# Patient Record
Sex: FEMALE | Race: WHITE | NOT HISPANIC OR LATINO | Employment: OTHER | URBAN - METROPOLITAN AREA
[De-identification: names, ages, dates, MRNs, and addresses within clinical notes are randomized per-mention and may not be internally consistent; named-entity substitution may affect disease eponyms.]

---

## 2017-12-27 NOTE — PATIENT DISCUSSION
Discussed near vision options: glasses over CL, monovision, and multifocal contact lenses with patient.

## 2018-01-17 ENCOUNTER — IMPORTED ENCOUNTER (OUTPATIENT)
Dept: URBAN - METROPOLITAN AREA CLINIC 43 | Facility: CLINIC | Age: 58
End: 2018-01-17

## 2018-01-17 PROBLEM — H25.13: Noted: 2018-01-17

## 2018-01-17 PROBLEM — H16.223: Noted: 2018-01-17

## 2019-02-05 ENCOUNTER — IMPORTED ENCOUNTER (OUTPATIENT)
Dept: URBAN - METROPOLITAN AREA CLINIC 43 | Facility: CLINIC | Age: 59
End: 2019-02-05

## 2019-02-05 PROBLEM — H16.223: Noted: 2019-02-05

## 2019-02-05 PROBLEM — INACTIVE: Noted: 2019-02-05

## 2019-02-05 PROBLEM — H25.13: Noted: 2019-02-05

## 2019-02-05 PROBLEM — H25.11: Noted: 2019-02-05

## 2019-02-05 PROBLEM — H10.021: Noted: 2019-02-05

## 2019-07-29 NOTE — PATIENT DISCUSSION
Patient informed on monovision.   She may be interested in [facility] if mv is successful.

## 2020-04-18 ASSESSMENT — VISUAL ACUITY
OS_CC: 20/20
OS_CC: J1+
OD_SC: J5+1
OD_SC: 20/70
OS_SC: 20/50 -1
OD_OTHER: 20/50.
OD_OTHER: 20/50.
OD_CC: J1+
OS_OTHER: 20/50.
OS_SC: J5
OS_OTHER: 20/40.
OD_SC: 20/50 +2
OS_SC: 20/100
OS_CC: 20/20
OD_CC: 20/20
OD_CC: 20/20
OS_CC: J1+
OD_CC: J1+

## 2020-04-18 ASSESSMENT — TONOMETRY
OD_IOP_MMHG: 15.0
OS_IOP_MMHG: 17.0
OD_IOP_MMHG: 14.0
OS_IOP_MMHG: 17.0

## 2020-04-18 ASSESSMENT — KERATOMETRY
OD_K2POWER_DIOPTERS: 41.75
OS_K1POWER_DIOPTERS: 44.5
OD_AXISANGLE2_DEGREES: 85
OS_K2POWER_DIOPTERS: 41.5
OD_AXISANGLE_DEGREES: 175
OS_AXISANGLE2_DEGREES: 90
OD_K1POWER_DIOPTERS: 45.25
OS_AXISANGLE_DEGREES: 180

## 2020-07-16 ENCOUNTER — ESTABLISHED COMPREHENSIVE EXAM (OUTPATIENT)
Dept: URBAN - METROPOLITAN AREA CLINIC 32 | Facility: CLINIC | Age: 60
End: 2020-07-16

## 2020-07-16 DIAGNOSIS — H25.13: ICD-10-CM

## 2020-07-16 PROCEDURE — 92014 COMPRE OPH EXAM EST PT 1/>: CPT

## 2020-07-16 PROCEDURE — 92015 DETERMINE REFRACTIVE STATE: CPT

## 2020-07-16 ASSESSMENT — KERATOMETRY
OS_AXISANGLE_DEGREES: 91
OD_AXISANGLE_DEGREES: 87
OD_K1POWER_DIOPTERS: 45
OS_AXISANGLE2_DEGREES: 1
OD_K2POWER_DIOPTERS: 41.75
OD_AXISANGLE2_DEGREES: 177
OS_K1POWER_DIOPTERS: 44.5
OS_K2POWER_DIOPTERS: 41.5

## 2020-07-16 ASSESSMENT — VISUAL ACUITY
OS_CC: J1
OS_GLARE: 20/80
OD_CC: 20/20
OS_SC: 20/80
OS_CC: 20/20-1
OS_SC: J7
OD_SC: J16
OD_SC: 20/80+1
OD_CC: J1+
OD_GLARE: 20/80

## 2020-07-16 ASSESSMENT — TONOMETRY
OD_IOP_MMHG: 16
OS_IOP_MMHG: 17

## 2020-09-22 ENCOUNTER — OFFICE VISIT (OUTPATIENT)
Dept: URBAN - METROPOLITAN AREA CLINIC 68 | Facility: CLINIC | Age: 60
End: 2020-09-22

## 2020-10-19 ENCOUNTER — OFFICE VISIT (OUTPATIENT)
Dept: URBAN - METROPOLITAN AREA SURGERY CENTER 12 | Facility: SURGERY CENTER | Age: 60
End: 2020-10-19

## 2020-10-20 ENCOUNTER — LAB OUTSIDE AN ENCOUNTER (OUTPATIENT)
Dept: URBAN - METROPOLITAN AREA CLINIC 68 | Facility: CLINIC | Age: 60
End: 2020-10-20

## 2020-10-20 LAB — 01: (no result)

## 2020-10-21 ENCOUNTER — TELEPHONE ENCOUNTER (OUTPATIENT)
Dept: URBAN - METROPOLITAN AREA CLINIC 68 | Facility: CLINIC | Age: 60
End: 2020-10-21

## 2020-10-22 ENCOUNTER — TELEPHONE ENCOUNTER (OUTPATIENT)
Dept: URBAN - METROPOLITAN AREA CLINIC 68 | Facility: CLINIC | Age: 60
End: 2020-10-22

## 2021-01-07 ENCOUNTER — SURGICAL TESTING (OUTPATIENT)
Dept: URBAN - METROPOLITAN AREA CLINIC 32 | Facility: CLINIC | Age: 61
End: 2021-01-07

## 2021-01-07 DIAGNOSIS — H52.213: ICD-10-CM

## 2021-01-07 DIAGNOSIS — H43.813: ICD-10-CM

## 2021-01-07 DIAGNOSIS — H18.513: ICD-10-CM

## 2021-01-07 DIAGNOSIS — H25.11: ICD-10-CM

## 2021-01-07 DIAGNOSIS — H25.12: ICD-10-CM

## 2021-01-07 PROCEDURE — 92134 CPTRZ OPH DX IMG PST SGM RTA: CPT

## 2021-01-07 PROCEDURE — 92286 ANT SGM IMG I&R SPECLR MIC: CPT

## 2021-01-07 PROCEDURE — 92136TC INTERFEROMETRY - TECHNICAL COMPONENT

## 2021-01-07 PROCEDURE — 92025 CPTRIZED CORNEAL TOPOGRAPHY: CPT

## 2021-01-07 PROCEDURE — 92014 COMPRE OPH EXAM EST PT 1/>: CPT

## 2021-01-07 ASSESSMENT — VISUAL ACUITY
OS_SC: 20/80
OD_SC: J16
OS_CC: J1+
OS_GLARE: 20/80
OD_CC: J1
OD_GLARE: 20/80
OD_SC: 20/80+1
OS_CC: 20/20
OS_SC: J7
OD_CC: 20/20

## 2021-01-07 ASSESSMENT — KERATOMETRY
OS_AXISANGLE2_DEGREES: 1
OS_AXISANGLE_DEGREES: 91
OS_K2POWER_DIOPTERS: 41.5
OD_AXISANGLE_DEGREES: 87
OS_K1POWER_DIOPTERS: 44.5
OD_K2POWER_DIOPTERS: 41.75
OD_AXISANGLE2_DEGREES: 177
OD_K1POWER_DIOPTERS: 45

## 2021-01-20 ENCOUNTER — SURGERY/PROCEDURE (OUTPATIENT)
Dept: URBAN - METROPOLITAN AREA CLINIC 32 | Facility: CLINIC | Age: 61
End: 2021-01-20

## 2021-01-20 DIAGNOSIS — H25.11: ICD-10-CM

## 2021-01-20 PROCEDURE — 66984CV REMOVE CATARACT, INSERT LENS, CUSTOM VISION

## 2021-01-21 ENCOUNTER — 1 DAY POST-OP (OUTPATIENT)
Dept: URBAN - METROPOLITAN AREA CLINIC 32 | Facility: CLINIC | Age: 61
End: 2021-01-21

## 2021-01-21 DIAGNOSIS — Z96.1: ICD-10-CM

## 2021-01-21 DIAGNOSIS — H25.11: ICD-10-CM

## 2021-01-21 PROCEDURE — 99024 POSTOP FOLLOW-UP VISIT: CPT

## 2021-01-21 ASSESSMENT — KERATOMETRY
OS_K2POWER_DIOPTERS: 41.5
OS_AXISANGLE2_DEGREES: 1
OD_K1POWER_DIOPTERS: 45
OD_K2POWER_DIOPTERS: 41.75
OD_AXISANGLE_DEGREES: 87
OS_K1POWER_DIOPTERS: 44.5
OD_AXISANGLE2_DEGREES: 177
OS_AXISANGLE_DEGREES: 91

## 2021-01-21 ASSESSMENT — TONOMETRY: OD_IOP_MMHG: 12

## 2021-01-21 ASSESSMENT — VISUAL ACUITY: OD_SC: 20/25-2

## 2021-01-22 ASSESSMENT — KERATOMETRY
OS_K1POWER_DIOPTERS: 44.5
OS_AXISANGLE_DEGREES: 91
OS_AXISANGLE2_DEGREES: 1
OD_AXISANGLE_DEGREES: 87
OD_AXISANGLE2_DEGREES: 177
OD_K2POWER_DIOPTERS: 41.75
OS_K2POWER_DIOPTERS: 41.5
OD_K1POWER_DIOPTERS: 45

## 2021-01-26 ENCOUNTER — POST-OP CATARACT (OUTPATIENT)
Dept: URBAN - METROPOLITAN AREA CLINIC 32 | Facility: CLINIC | Age: 61
End: 2021-01-26

## 2021-01-26 DIAGNOSIS — H25.11: ICD-10-CM

## 2021-01-26 DIAGNOSIS — H25.12: ICD-10-CM

## 2021-01-26 DIAGNOSIS — Z96.1: ICD-10-CM

## 2021-01-26 PROCEDURE — 92012 INTRM OPH EXAM EST PATIENT: CPT

## 2021-01-26 ASSESSMENT — KERATOMETRY
OD_AXISANGLE2_DEGREES: 177
OS_AXISANGLE_DEGREES: 91
OS_K1POWER_DIOPTERS: 44.5
OD_K2POWER_DIOPTERS: 41.75
OS_AXISANGLE2_DEGREES: 1
OD_K1POWER_DIOPTERS: 45
OS_K2POWER_DIOPTERS: 41.5
OD_AXISANGLE_DEGREES: 87

## 2021-01-26 ASSESSMENT — VISUAL ACUITY
OD_SC: 20/25-1
OD_SC: J7
OS_SC: J6
OS_SC: 20/100
OS_PH: 20/30

## 2021-01-26 ASSESSMENT — TONOMETRY
OS_IOP_MMHG: 18
OD_IOP_MMHG: 17

## 2021-02-03 ENCOUNTER — SURGERY/PROCEDURE (OUTPATIENT)
Dept: URBAN - METROPOLITAN AREA CLINIC 32 | Facility: CLINIC | Age: 61
End: 2021-02-03

## 2021-02-03 DIAGNOSIS — H25.12: ICD-10-CM

## 2021-02-03 PROCEDURE — 66984CV REMOVE CATARACT, INSERT LENS, CUSTOM VISION

## 2021-02-04 ENCOUNTER — POST-OP CATARACT (OUTPATIENT)
Dept: URBAN - METROPOLITAN AREA CLINIC 32 | Facility: CLINIC | Age: 61
End: 2021-02-04

## 2021-02-04 DIAGNOSIS — Z96.1: ICD-10-CM

## 2021-02-04 PROCEDURE — 99024 POSTOP FOLLOW-UP VISIT: CPT

## 2021-02-04 ASSESSMENT — KERATOMETRY
OS_AXISANGLE_DEGREES: 91
OS_K1POWER_DIOPTERS: 44.5
OS_AXISANGLE_DEGREES: 91
OD_K2POWER_DIOPTERS: 41.75
OS_AXISANGLE2_DEGREES: 1
OD_AXISANGLE2_DEGREES: 177
OD_K2POWER_DIOPTERS: 41.75
OD_AXISANGLE_DEGREES: 87
OD_K1POWER_DIOPTERS: 45
OS_K1POWER_DIOPTERS: 44.5
OD_AXISANGLE2_DEGREES: 177
OS_AXISANGLE2_DEGREES: 1
OD_K1POWER_DIOPTERS: 45
OS_K2POWER_DIOPTERS: 41.5
OD_AXISANGLE_DEGREES: 87
OS_K2POWER_DIOPTERS: 41.5

## 2021-02-04 ASSESSMENT — VISUAL ACUITY
OS_SC: 20/20
OD_SC: 20/25

## 2021-02-04 ASSESSMENT — TONOMETRY: OS_IOP_MMHG: 17

## 2021-02-09 ENCOUNTER — POST-OP CATARACT (OUTPATIENT)
Dept: URBAN - METROPOLITAN AREA CLINIC 32 | Facility: CLINIC | Age: 61
End: 2021-02-09

## 2021-02-09 DIAGNOSIS — Z96.1: ICD-10-CM

## 2021-02-09 PROCEDURE — 99024 POSTOP FOLLOW-UP VISIT: CPT

## 2021-02-09 ASSESSMENT — KERATOMETRY
OS_K1POWER_DIOPTERS: 44.5
OS_K2POWER_DIOPTERS: 41.5
OD_AXISANGLE_DEGREES: 87
OD_K2POWER_DIOPTERS: 41.75
OS_AXISANGLE_DEGREES: 91
OS_AXISANGLE2_DEGREES: 1
OD_AXISANGLE2_DEGREES: 177
OD_K1POWER_DIOPTERS: 45

## 2021-02-09 ASSESSMENT — VISUAL ACUITY
OD_SC: 20/25
OS_SC: J10
OS_SC: 20/20

## 2021-03-05 ENCOUNTER — POST-OP CATARACT (OUTPATIENT)
Dept: URBAN - METROPOLITAN AREA CLINIC 32 | Facility: CLINIC | Age: 61
End: 2021-03-05

## 2021-03-05 DIAGNOSIS — Z96.1: ICD-10-CM

## 2021-03-05 PROCEDURE — 99024 POSTOP FOLLOW-UP VISIT: CPT

## 2021-03-05 ASSESSMENT — KERATOMETRY
OD_AXISANGLE_DEGREES: 87
OD_AXISANGLE2_DEGREES: 177
OS_AXISANGLE2_DEGREES: 1
OS_AXISANGLE_DEGREES: 91
OD_K1POWER_DIOPTERS: 45
OD_K2POWER_DIOPTERS: 41.75
OS_K2POWER_DIOPTERS: 41.5
OS_K1POWER_DIOPTERS: 44.5

## 2021-03-05 ASSESSMENT — VISUAL ACUITY
OD_SC: J7
OS_SC: J16
OD_SC: 20/30
OS_SC: 20/20

## 2021-06-01 ENCOUNTER — OFFICE VISIT (OUTPATIENT)
Dept: URBAN - METROPOLITAN AREA CLINIC 68 | Facility: CLINIC | Age: 61
End: 2021-06-01

## 2021-09-01 ENCOUNTER — ESTABLISHED COMPREHENSIVE EXAM (OUTPATIENT)
Dept: URBAN - METROPOLITAN AREA CLINIC 32 | Facility: CLINIC | Age: 61
End: 2021-09-01

## 2021-09-01 DIAGNOSIS — H16.223: ICD-10-CM

## 2021-09-01 DIAGNOSIS — H52.213: ICD-10-CM

## 2021-09-01 DIAGNOSIS — H26.493: ICD-10-CM

## 2021-09-01 DIAGNOSIS — Z96.1: ICD-10-CM

## 2021-09-01 DIAGNOSIS — H18.513: ICD-10-CM

## 2021-09-01 PROCEDURE — 92014 COMPRE OPH EXAM EST PT 1/>: CPT

## 2021-09-01 PROCEDURE — 92015 DETERMINE REFRACTIVE STATE: CPT

## 2021-09-01 ASSESSMENT — VISUAL ACUITY
OD_SC: 20/20-1
OD_CC: J1+
OS_CC: J1+
OS_SC: 20/20

## 2021-09-01 ASSESSMENT — TONOMETRY
OS_IOP_MMHG: 14
OD_IOP_MMHG: 12

## 2021-09-01 ASSESSMENT — KERATOMETRY
OS_K2POWER_DIOPTERS: 41.50
OD_AXISANGLE_DEGREES: 85
OS_K1POWER_DIOPTERS: 44.50
OS_AXISANGLE2_DEGREES: 180
OS_AXISANGLE_DEGREES: 90
OD_K2POWER_DIOPTERS: 41.75
OD_AXISANGLE2_DEGREES: 175
OD_K1POWER_DIOPTERS: 45

## 2021-09-08 ENCOUNTER — OFFICE VISIT (OUTPATIENT)
Dept: URBAN - METROPOLITAN AREA CLINIC 68 | Facility: CLINIC | Age: 61
End: 2021-09-08

## 2021-09-27 ENCOUNTER — OFFICE VISIT (OUTPATIENT)
Dept: URBAN - METROPOLITAN AREA SURGERY CENTER 12 | Facility: SURGERY CENTER | Age: 61
End: 2021-09-27

## 2021-09-28 ENCOUNTER — LAB OUTSIDE AN ENCOUNTER (OUTPATIENT)
Dept: URBAN - METROPOLITAN AREA CLINIC 68 | Facility: CLINIC | Age: 61
End: 2021-09-28

## 2021-09-28 LAB — 01: (no result)

## 2021-10-04 ENCOUNTER — TELEPHONE ENCOUNTER (OUTPATIENT)
Dept: URBAN - METROPOLITAN AREA CLINIC 68 | Facility: CLINIC | Age: 61
End: 2021-10-04

## 2022-06-04 ENCOUNTER — TELEPHONE ENCOUNTER (OUTPATIENT)
Dept: URBAN - METROPOLITAN AREA CLINIC 68 | Facility: CLINIC | Age: 62
End: 2022-06-04

## 2022-06-04 RX ORDER — SODIUM PICOSULFATE, MAGNESIUM OXIDE, AND ANHYDROUS CITRIC ACID 10; 3.5; 12 MG/160ML; G/160ML; G/160ML
LIQUID ORAL AS DIRECTED
Qty: 160 | Refills: 0 | OUTPATIENT
Start: 2020-12-08 | End: 2020-12-09

## 2022-06-04 RX ORDER — OMEPRAZOLE 40 MG/1
OMEPRAZOLE( 40MG ORAL 1 DAILY ) INACTIVE -HX ENTRY CAPSULE, DELAYED RELEASE PELLETS ORAL DAILY
OUTPATIENT
Start: 2020-09-22

## 2022-06-04 RX ORDER — LEVOTHYROXINE SODIUM 0.03 MG/1
LEVOTHYROXINE SODIUM( 25MCG ORAL 1/2 DAILY ) INACTIVE -HX ENTRY TABLET ORAL DAILY
OUTPATIENT
Start: 2021-09-08

## 2022-06-05 ENCOUNTER — TELEPHONE ENCOUNTER (OUTPATIENT)
Dept: URBAN - METROPOLITAN AREA CLINIC 68 | Facility: CLINIC | Age: 62
End: 2022-06-05

## 2022-06-05 RX ORDER — OMEPRAZOLE 20 MG/1
CAPSULE, DELAYED RELEASE ORAL DAILY
Qty: 30 | Refills: 30 | Status: ACTIVE | COMMUNITY
Start: 2021-09-08

## 2022-06-05 RX ORDER — OMEPRAZOLE 40 MG/1
CAPSULE, DELAYED RELEASE PELLETS ORAL DAILY
Qty: 90 | Refills: 90 | Status: ACTIVE | COMMUNITY
Start: 2021-08-28

## 2022-06-05 RX ORDER — OMEPRAZOLE 20 MG/1
TABLET, DELAYED RELEASE ORAL DAILY
Qty: 90 | Refills: 90 | Status: ACTIVE | COMMUNITY
Start: 2021-10-04

## 2022-06-05 RX ORDER — UBIDECARENONE 30 MG
MAGNESIUM( 250MG ORAL 2 DAILY ) ACTIVE -HX ENTRY CAPSULE ORAL DAILY
Status: ACTIVE | COMMUNITY
Start: 2020-09-22

## 2022-06-05 RX ORDER — B-COMPLEX WITH VITAMIN C
B COMPLEX-C(  ORAL 5000MG DAILY ) ACTIVE -HX ENTRY TABLET ORAL DAILY
Status: ACTIVE | COMMUNITY
Start: 2020-09-22

## 2022-06-21 ENCOUNTER — EMERGENCY VISIT (OUTPATIENT)
Dept: URBAN - METROPOLITAN AREA CLINIC 32 | Facility: CLINIC | Age: 62
End: 2022-06-21

## 2022-06-21 DIAGNOSIS — H43.813: ICD-10-CM

## 2022-06-21 PROCEDURE — 99212 OFFICE O/P EST SF 10 MIN: CPT

## 2022-06-21 ASSESSMENT — TONOMETRY
OD_IOP_MMHG: 13
OS_IOP_MMHG: 13

## 2022-06-21 ASSESSMENT — VISUAL ACUITY
OS_SC: 20/20
OD_SC: 20/20

## 2022-06-21 ASSESSMENT — KERATOMETRY
OS_AXISANGLE_DEGREES: 90
OS_K1POWER_DIOPTERS: 44.50
OS_K2POWER_DIOPTERS: 41.50
OD_K2POWER_DIOPTERS: 41.75
OD_K1POWER_DIOPTERS: 45
OD_AXISANGLE2_DEGREES: 175
OS_AXISANGLE2_DEGREES: 180
OD_AXISANGLE_DEGREES: 85

## 2022-06-25 ENCOUNTER — TELEPHONE ENCOUNTER (OUTPATIENT)
Age: 62
End: 2022-06-25

## 2022-06-25 RX ORDER — OMEPRAZOLE 40 MG/1
OMEPRAZOLE( 40MG ORAL 1 DAILY ) INACTIVE -HX ENTRY CAPSULE, DELAYED RELEASE ORAL DAILY
OUTPATIENT
Start: 2020-09-22

## 2022-06-25 RX ORDER — LEVOTHYROXINE SODIUM 25 UG/1
LEVOTHYROXINE SODIUM( 25MCG ORAL 1/2 DAILY ) INACTIVE -HX ENTRY TABLET ORAL DAILY
OUTPATIENT
Start: 2021-09-08

## 2022-06-25 RX ORDER — SODIUM PICOSULFATE, MAGNESIUM OXIDE, AND ANHYDROUS CITRIC ACID 10; 3.5; 12 MG/160ML; G/160ML; G/160ML
LIQUID ORAL AS DIRECTED
Qty: 160 | Refills: 0 | OUTPATIENT
Start: 2020-12-08 | End: 2020-12-09

## 2022-06-26 ENCOUNTER — TELEPHONE ENCOUNTER (OUTPATIENT)
Age: 62
End: 2022-06-26

## 2022-06-26 RX ORDER — FOLIC ACID/VIT B COMPLEX AND C 400 MCG
B COMPLEX-C(  ORAL 5000MG DAILY ) ACTIVE -HX ENTRY TABLET ORAL DAILY
Status: ACTIVE | COMMUNITY
Start: 2020-09-22

## 2022-06-26 RX ORDER — ZINC SULFATE 66 MG
ZINC( 10MG ORAL 2 DAILY ) ACTIVE -HX ENTRY TABLET ORAL DAILY
Status: ACTIVE | COMMUNITY
Start: 2020-09-22

## 2022-06-26 RX ORDER — OMEPRAZOLE 20 MG/1
TABLET, DELAYED RELEASE ORAL DAILY
Qty: 90 | Refills: 90 | Status: ACTIVE | COMMUNITY
Start: 2021-10-04

## 2022-06-26 RX ORDER — OMEPRAZOLE 20 MG/1
CAPSULE, DELAYED RELEASE ORAL DAILY
Qty: 30 | Refills: 30 | Status: ACTIVE | COMMUNITY
Start: 2021-09-08

## 2022-06-26 RX ORDER — OMEPRAZOLE 40 MG/1
CAPSULE, DELAYED RELEASE ORAL DAILY
Qty: 90 | Refills: 90 | Status: ACTIVE | COMMUNITY
Start: 2021-08-28

## 2022-08-16 ENCOUNTER — COMPREHENSIVE EXAM (OUTPATIENT)
Dept: URBAN - METROPOLITAN AREA CLINIC 32 | Facility: CLINIC | Age: 62
End: 2022-08-16

## 2022-08-16 DIAGNOSIS — H18.513: ICD-10-CM

## 2022-08-16 DIAGNOSIS — H16.223: ICD-10-CM

## 2022-08-16 DIAGNOSIS — H43.813: ICD-10-CM

## 2022-08-16 DIAGNOSIS — H26.493: ICD-10-CM

## 2022-08-16 PROCEDURE — 92014 COMPRE OPH EXAM EST PT 1/>: CPT

## 2022-08-16 ASSESSMENT — KERATOMETRY
OS_K1POWER_DIOPTERS: 44.25
OD_AXISANGLE2_DEGREES: 180
OD_K2POWER_DIOPTERS: 41.75
OS_K2POWER_DIOPTERS: 41.50
OS_AXISANGLE_DEGREES: 90
OD_AXISANGLE_DEGREES: 90
OD_K1POWER_DIOPTERS: 44.75
OS_AXISANGLE2_DEGREES: 180

## 2022-08-16 ASSESSMENT — VISUAL ACUITY
OD_SC: 20/20-1
OD_GLARE: 20/40
OS_SC: 20/20
OS_GLARE: 20/40
OD_CC: J1+
OS_CC: J1+

## 2022-08-16 ASSESSMENT — TONOMETRY
OS_IOP_MMHG: 17
OD_IOP_MMHG: 14

## 2022-11-09 ENCOUNTER — OFFICE VISIT (OUTPATIENT)
Dept: URBAN - METROPOLITAN AREA CLINIC 68 | Facility: CLINIC | Age: 62
End: 2022-11-09

## 2022-11-09 RX ORDER — B-COMPLEX WITH VITAMIN C
B COMPLEX-C(  ORAL 5000MG DAILY ) ACTIVE -HX ENTRY TABLET ORAL DAILY
Status: ACTIVE | COMMUNITY
Start: 2020-09-22

## 2022-11-09 RX ORDER — UBIDECARENONE 30 MG
MAGNESIUM( 250MG ORAL 2 DAILY ) ACTIVE -HX ENTRY CAPSULE ORAL DAILY
Status: ACTIVE | COMMUNITY
Start: 2020-09-22

## 2022-11-09 RX ORDER — OMEPRAZOLE 20 MG/1
TABLET, DELAYED RELEASE ORAL DAILY
Qty: 90 | Refills: 90 | Status: ACTIVE | COMMUNITY
Start: 2021-10-04

## 2022-11-09 RX ORDER — OMEPRAZOLE 40 MG/1
CAPSULE, DELAYED RELEASE PELLETS ORAL DAILY
Qty: 90 | Refills: 90 | Status: ACTIVE | COMMUNITY
Start: 2021-08-28

## 2022-11-09 RX ORDER — FAMOTIDINE 20 MG/1
1 TABLET AT BEDTIME AS NEEDED TABLET, FILM COATED ORAL ONCE A DAY
Qty: 30 | Refills: 11 | OUTPATIENT

## 2022-11-09 RX ORDER — OMEPRAZOLE 20 MG/1
CAPSULE, DELAYED RELEASE ORAL DAILY
Qty: 30 | Refills: 30 | Status: ACTIVE | COMMUNITY
Start: 2021-09-08

## 2022-11-09 NOTE — HPI-MIGRATED HPI
Transition to Care : 62 y.o. WF  with chronic GERD and history of Waggoner's esophagus who is here for follow up. She is s/p an EGD on 9/2021 which showed a small hiatal hernia, gastric polyps, gastritis. No Waggoner's on pathology. She has been doing well on Omeprazole 20mg/d. She only felt hoarseness on initial presentation. She denies any reflux or heartburn. She is willing to switch to Famotidine to avoid long term associations with chronic PPI use.

## 2022-12-18 ENCOUNTER — ERX REFILL RESPONSE (OUTPATIENT)
Dept: URBAN - METROPOLITAN AREA CLINIC 68 | Facility: CLINIC | Age: 62
End: 2022-12-18

## 2022-12-18 RX ORDER — OMEPRAZOLE 20 MG/1
TAKE 1 TABLET BY MOUTH DAILY CAPSULE, DELAYED RELEASE ORAL
Qty: 90 CAPSULE | Refills: 0 | OUTPATIENT

## 2022-12-18 RX ORDER — OMEPRAZOLE 20 MG/1
TABLET, DELAYED RELEASE ORAL DAILY
Qty: 90 | Refills: 90 | OUTPATIENT

## 2023-03-21 ENCOUNTER — ERX REFILL RESPONSE (OUTPATIENT)
Dept: URBAN - METROPOLITAN AREA CLINIC 68 | Facility: CLINIC | Age: 63
End: 2023-03-21

## 2023-03-21 RX ORDER — OMEPRAZOLE 40 MG/1
CAPSULE, DELAYED RELEASE PELLETS ORAL DAILY
Qty: 90 | Refills: 90 | OUTPATIENT

## 2023-03-21 RX ORDER — OMEPRAZOLE 20 MG/1
TAKE 1 CAPSULE BY MOUTH DAILY CAPSULE, DELAYED RELEASE ORAL
Qty: 90 CAPSULE | Refills: 0 | OUTPATIENT

## 2023-06-16 ENCOUNTER — ERX REFILL RESPONSE (OUTPATIENT)
Dept: URBAN - METROPOLITAN AREA CLINIC 68 | Facility: CLINIC | Age: 63
End: 2023-06-16

## 2023-06-16 RX ORDER — OMEPRAZOLE 20 MG/1
TAKE 1 CAPSULE BY MOUTH DAILY CAPSULE, DELAYED RELEASE ORAL
Qty: 90 CAPSULE | Refills: 0 | OUTPATIENT

## 2023-08-23 ENCOUNTER — COMPREHENSIVE EXAM (OUTPATIENT)
Dept: URBAN - METROPOLITAN AREA CLINIC 32 | Facility: CLINIC | Age: 63
End: 2023-08-23

## 2023-08-23 DIAGNOSIS — H18.513: ICD-10-CM

## 2023-08-23 DIAGNOSIS — H43.813: ICD-10-CM

## 2023-08-23 DIAGNOSIS — H26.493: ICD-10-CM

## 2023-08-23 DIAGNOSIS — H16.223: ICD-10-CM

## 2023-08-23 PROCEDURE — 99214 OFFICE O/P EST MOD 30 MIN: CPT

## 2023-08-23 PROCEDURE — 92015 DETERMINE REFRACTIVE STATE: CPT

## 2023-08-23 ASSESSMENT — TONOMETRY
OD_IOP_MMHG: 16
OS_IOP_MMHG: 16

## 2023-08-23 ASSESSMENT — VISUAL ACUITY
OD_SC: 20/20-1
OS_CC: J1+
OS_GLARE: 20/40
OD_CC: J1+
OD_GLARE: 20/40
OS_SC: 20/20

## 2023-08-23 ASSESSMENT — KERATOMETRY
OD_K2POWER_DIOPTERS: 41.75
OD_AXISANGLE2_DEGREES: 180
OS_AXISANGLE2_DEGREES: 180
OS_K2POWER_DIOPTERS: 41.50
OS_K1POWER_DIOPTERS: 44.25
OD_K1POWER_DIOPTERS: 44.75
OS_AXISANGLE_DEGREES: 90
OD_AXISANGLE_DEGREES: 90

## 2023-09-11 ENCOUNTER — SURGERY/PROCEDURE (OUTPATIENT)
Dept: URBAN - METROPOLITAN AREA CLINIC 32 | Facility: CLINIC | Age: 63
End: 2023-09-11

## 2023-09-11 DIAGNOSIS — H26.491: ICD-10-CM

## 2023-09-11 PROCEDURE — 66821 AFTER CATARACT LASER SURGERY: CPT

## 2023-09-13 ASSESSMENT — KERATOMETRY
OS_AXISANGLE_DEGREES: 90
OD_K2POWER_DIOPTERS: 41.75
OD_K1POWER_DIOPTERS: 44.75
OS_K1POWER_DIOPTERS: 44.25
OD_AXISANGLE2_DEGREES: 180
OS_AXISANGLE2_DEGREES: 180
OD_AXISANGLE_DEGREES: 90
OS_K2POWER_DIOPTERS: 41.50

## 2023-09-19 ENCOUNTER — TELEPHONE ENCOUNTER (OUTPATIENT)
Dept: URBAN - METROPOLITAN AREA CLINIC 68 | Facility: CLINIC | Age: 63
End: 2023-09-19

## 2023-09-19 RX ORDER — B-COMPLEX WITH VITAMIN C
B COMPLEX-C(  ORAL 5000MG DAILY ) ACTIVE -HX ENTRY TABLET ORAL DAILY
Status: ACTIVE | COMMUNITY
Start: 2020-09-22

## 2023-09-19 RX ORDER — OMEPRAZOLE 20 MG/1
1 CAPSULE 30 MINUTES BEFORE MORNING MEAL CAPSULE, DELAYED RELEASE ORAL ONCE A DAY
Qty: 90 CAPSULE | Refills: 2 | OUTPATIENT
Start: 2023-09-19

## 2023-09-19 RX ORDER — OMEPRAZOLE 20 MG/1
TAKE 1 CAPSULE BY MOUTH DAILY CAPSULE, DELAYED RELEASE ORAL
Qty: 90 CAPSULE | Refills: 0 | Status: ACTIVE | COMMUNITY

## 2023-09-19 RX ORDER — FAMOTIDINE 20 MG/1
1 TABLET AT BEDTIME AS NEEDED TABLET, FILM COATED ORAL ONCE A DAY
Qty: 30 | Refills: 11 | Status: ACTIVE | COMMUNITY

## 2023-09-19 RX ORDER — UBIDECARENONE 30 MG
MAGNESIUM( 250MG ORAL 2 DAILY ) ACTIVE -HX ENTRY CAPSULE ORAL DAILY
Status: ACTIVE | COMMUNITY
Start: 2020-09-22

## 2023-09-22 ENCOUNTER — DASHBOARD ENCOUNTERS (OUTPATIENT)
Age: 63
End: 2023-09-22

## 2023-09-22 ENCOUNTER — OFFICE VISIT (OUTPATIENT)
Dept: URBAN - METROPOLITAN AREA CLINIC 68 | Facility: CLINIC | Age: 63
End: 2023-09-22
Payer: COMMERCIAL

## 2023-09-22 ENCOUNTER — LAB OUTSIDE AN ENCOUNTER (OUTPATIENT)
Dept: URBAN - METROPOLITAN AREA CLINIC 68 | Facility: CLINIC | Age: 63
End: 2023-09-22

## 2023-09-22 ENCOUNTER — WEB ENCOUNTER (OUTPATIENT)
Dept: URBAN - METROPOLITAN AREA CLINIC 68 | Facility: CLINIC | Age: 63
End: 2023-09-22

## 2023-09-22 VITALS
WEIGHT: 145 LBS | HEIGHT: 66 IN | HEART RATE: 77 BPM | SYSTOLIC BLOOD PRESSURE: 118 MMHG | OXYGEN SATURATION: 96 % | BODY MASS INDEX: 23.3 KG/M2 | DIASTOLIC BLOOD PRESSURE: 80 MMHG

## 2023-09-22 DIAGNOSIS — K22.70 BARRETT'S ESOPHAGUS WITHOUT DYSPLASIA: ICD-10-CM

## 2023-09-22 DIAGNOSIS — K21.9 GASTROESOPHAGEAL REFLUX DISEASE WITHOUT ESOPHAGITIS: ICD-10-CM

## 2023-09-22 PROBLEM — 266435005: Status: ACTIVE | Noted: 2023-09-22

## 2023-09-22 PROCEDURE — 99214 OFFICE O/P EST MOD 30 MIN: CPT | Performed by: INTERNAL MEDICINE

## 2023-09-22 RX ORDER — B-COMPLEX WITH VITAMIN C
B COMPLEX-C(  ORAL 5000MG DAILY ) ACTIVE -HX ENTRY TABLET ORAL DAILY
Status: ACTIVE | COMMUNITY
Start: 2020-09-22

## 2023-09-22 RX ORDER — FAMOTIDINE 20 MG/1
1 TABLET AT BEDTIME AS NEEDED TABLET, FILM COATED ORAL ONCE A DAY
Qty: 30 | Refills: 11 | Status: DISCONTINUED | COMMUNITY

## 2023-09-22 RX ORDER — OMEPRAZOLE 20 MG/1
TAKE 1 CAPSULE BY MOUTH DAILY CAPSULE, DELAYED RELEASE ORAL
Qty: 90 CAPSULE | Refills: 0 | Status: ACTIVE | COMMUNITY

## 2023-09-22 RX ORDER — UBIDECARENONE 30 MG
MAGNESIUM( 250MG ORAL 2 DAILY ) ACTIVE -HX ENTRY CAPSULE ORAL DAILY
Status: ACTIVE | COMMUNITY
Start: 2020-09-22

## 2023-09-22 RX ORDER — OMEPRAZOLE 20 MG/1
1 CAPSULE 30 MINUTES BEFORE MORNING MEAL CAPSULE, DELAYED RELEASE ORAL ONCE A DAY
Qty: 90 CAPSULE | Refills: 4 | OUTPATIENT
Start: 2023-09-22

## 2023-09-22 RX ORDER — OMEPRAZOLE 20 MG/1
1 CAPSULE 30 MINUTES BEFORE MORNING MEAL CAPSULE, DELAYED RELEASE ORAL ONCE A DAY
Qty: 90 CAPSULE | Refills: 2 | Status: ACTIVE | COMMUNITY
Start: 2023-09-19

## 2023-09-22 NOTE — HPI-TODAY'S VISIT:
62 y.o. WF with chronic GERD and history of Waggoner's esophagus who is here for follow up. She is s/p an EGD on 9/2021 which showed a small hiatal hernia, gastric polyps, gastritis. No Waggoner's on pathology. She has been doing well on Omeprazole 20mg/d. She does not feel that Famotidine worked as well as Omeprazole for her main complaint of "deep voice" and she had an upset stomach.

## 2023-09-26 ENCOUNTER — POST-OP (OUTPATIENT)
Dept: URBAN - METROPOLITAN AREA CLINIC 32 | Facility: CLINIC | Age: 63
End: 2023-09-26

## 2023-09-26 DIAGNOSIS — H53.451: ICD-10-CM

## 2023-09-26 DIAGNOSIS — Z96.1: ICD-10-CM

## 2023-09-26 PROCEDURE — 99024 POSTOP FOLLOW-UP VISIT: CPT

## 2023-09-26 ASSESSMENT — VISUAL ACUITY
OD_SC: 20/25
OS_SC: 20/20

## 2023-09-26 ASSESSMENT — TONOMETRY
OS_IOP_MMHG: 19
OD_IOP_MMHG: 17

## 2023-10-18 ENCOUNTER — FOLLOW UP (OUTPATIENT)
Dept: URBAN - METROPOLITAN AREA CLINIC 32 | Facility: CLINIC | Age: 63
End: 2023-10-18

## 2023-10-18 DIAGNOSIS — H53.451: ICD-10-CM

## 2023-10-18 DIAGNOSIS — H43.391: ICD-10-CM

## 2023-10-18 PROCEDURE — 92083 EXTENDED VISUAL FIELD XM: CPT

## 2023-10-18 PROCEDURE — 99213 OFFICE O/P EST LOW 20 MIN: CPT

## 2023-10-18 ASSESSMENT — VISUAL ACUITY
OS_SC: 20/20
OD_SC: 20/25-1

## 2023-10-18 ASSESSMENT — TONOMETRY
OS_IOP_MMHG: 14
OD_IOP_MMHG: 13

## 2023-11-27 ENCOUNTER — NEW PATIENT (OUTPATIENT)
Dept: URBAN - METROPOLITAN AREA CLINIC 33 | Facility: CLINIC | Age: 63
End: 2023-11-27

## 2023-11-27 VITALS
HEIGHT: 66 IN | WEIGHT: 145 LBS | HEART RATE: 63 BPM | SYSTOLIC BLOOD PRESSURE: 111 MMHG | DIASTOLIC BLOOD PRESSURE: 75 MMHG | BODY MASS INDEX: 23.3 KG/M2

## 2023-11-27 DIAGNOSIS — H31.103: ICD-10-CM

## 2023-11-27 DIAGNOSIS — H43.811: ICD-10-CM

## 2023-11-27 DIAGNOSIS — H43.391: ICD-10-CM

## 2023-11-27 DIAGNOSIS — H04.123: ICD-10-CM

## 2023-11-27 PROCEDURE — 92250 FUNDUS PHOTOGRAPHY W/I&R: CPT

## 2023-11-27 PROCEDURE — 99204 OFFICE O/P NEW MOD 45 MIN: CPT

## 2023-11-27 PROCEDURE — 92134 CPTRZ OPH DX IMG PST SGM RTA: CPT

## 2023-11-27 ASSESSMENT — TONOMETRY
OS_IOP_MMHG: 13
OD_IOP_MMHG: 14

## 2023-11-27 ASSESSMENT — VISUAL ACUITY
OD_SC: 20/20-1
OS_SC: 20/20

## 2024-09-27 ENCOUNTER — OFFICE VISIT (OUTPATIENT)
Dept: URBAN - METROPOLITAN AREA CLINIC 68 | Facility: CLINIC | Age: 64
End: 2024-09-27

## 2024-10-08 ENCOUNTER — OFFICE VISIT (OUTPATIENT)
Dept: URBAN - METROPOLITAN AREA CLINIC 68 | Facility: CLINIC | Age: 64
End: 2024-10-08
Payer: COMMERCIAL

## 2024-10-08 ENCOUNTER — LAB OUTSIDE AN ENCOUNTER (OUTPATIENT)
Dept: URBAN - METROPOLITAN AREA CLINIC 68 | Facility: CLINIC | Age: 64
End: 2024-10-08

## 2024-10-08 VITALS
BODY MASS INDEX: 22.98 KG/M2 | WEIGHT: 143 LBS | HEIGHT: 66 IN | DIASTOLIC BLOOD PRESSURE: 78 MMHG | SYSTOLIC BLOOD PRESSURE: 118 MMHG

## 2024-10-08 DIAGNOSIS — K22.70 BARRETT'S ESOPHAGUS WITHOUT DYSPLASIA: ICD-10-CM

## 2024-10-08 DIAGNOSIS — K44.9 HIATAL HERNIA: ICD-10-CM

## 2024-10-08 DIAGNOSIS — K31.7 GASTRIC POLYPS: ICD-10-CM

## 2024-10-08 PROBLEM — 78809005: Status: ACTIVE | Noted: 2024-10-08

## 2024-10-08 PROCEDURE — 99213 OFFICE O/P EST LOW 20 MIN: CPT | Performed by: INTERNAL MEDICINE

## 2024-10-08 RX ORDER — UBIDECARENONE 30 MG
MAGNESIUM( 250MG ORAL 2 DAILY ) ACTIVE -HX ENTRY CAPSULE ORAL DAILY
Status: ACTIVE | COMMUNITY
Start: 2020-09-22

## 2024-10-08 RX ORDER — OMEPRAZOLE 20 MG/1
1 CAPSULE 30 MINUTES BEFORE MORNING MEAL CAPSULE, DELAYED RELEASE ORAL ONCE A DAY
Qty: 90 CAPSULE | Refills: 2 | Status: DISCONTINUED | COMMUNITY
Start: 2023-09-19

## 2024-10-08 RX ORDER — B-COMPLEX WITH VITAMIN C
B COMPLEX-C(  ORAL 5000MG DAILY ) ACTIVE -HX ENTRY TABLET ORAL DAILY
Status: ACTIVE | COMMUNITY
Start: 2020-09-22

## 2024-10-08 RX ORDER — OMEPRAZOLE 20 MG/1
1 CAPSULE 30 MINUTES BEFORE MORNING MEAL CAPSULE, DELAYED RELEASE ORAL ONCE A DAY
Qty: 90 CAPSULE | Refills: 4 | Status: DISCONTINUED | COMMUNITY
Start: 2023-09-22

## 2024-10-08 RX ORDER — OMEPRAZOLE 20 MG/1
TAKE 1 CAPSULE BY MOUTH DAILY CAPSULE, DELAYED RELEASE ORAL
Qty: 90 CAPSULE | Refills: 0 | Status: ACTIVE | COMMUNITY

## 2024-10-08 NOTE — HPI-TODAY'S VISIT:
63 y.o. WF with chronic GERD and history of Waggoner's esophagus who is here for follow up. She is s/p an EGD on 9/2021 which showed a small hiatal hernia, gastric polyps, gastritis. No Waggoner's on pathology. She has been doing well on Omeprazole 20mg/d. She does not feel that Famotidine worked as well as Omeprazole for her main complaint of "deep voice" and she had an upset stomach.

## 2024-10-15 ENCOUNTER — OFFICE VISIT (OUTPATIENT)
Dept: URBAN - METROPOLITAN AREA SURGERY CENTER 12 | Facility: SURGERY CENTER | Age: 64
End: 2024-10-15
Payer: COMMERCIAL

## 2024-10-15 ENCOUNTER — CLAIMS CREATED FROM THE CLAIM WINDOW (OUTPATIENT)
Dept: URBAN - METROPOLITAN AREA CLINIC 4 | Facility: CLINIC | Age: 64
End: 2024-10-15
Payer: COMMERCIAL

## 2024-10-15 DIAGNOSIS — K22.89 OTHER SPECIFIED DISEASE OF ESOPHAGUS: ICD-10-CM

## 2024-10-15 DIAGNOSIS — K44.9 HIATAL HERNIA: ICD-10-CM

## 2024-10-15 DIAGNOSIS — K31.89 OTHER DISEASES OF STOMACH AND DUODENUM: ICD-10-CM

## 2024-10-15 DIAGNOSIS — K44.9 DIAPHRAGMATIC HERNIA WITHOUT OBSTRUCTION OR GANGRENE: ICD-10-CM

## 2024-10-15 DIAGNOSIS — K29.70 GASTRITIS, UNSPECIFIED, WITHOUT BLEEDING: ICD-10-CM

## 2024-10-15 DIAGNOSIS — K21.9 GASTRO-ESOPHAGEAL REFLUX DISEASE WITHOUT ESOPHAGITIS: ICD-10-CM

## 2024-10-15 DIAGNOSIS — K31.7 POLYP OF STOMACH AND DUODENUM: ICD-10-CM

## 2024-10-15 PROCEDURE — 00731 ANES UPR GI NDSC PX NOS: CPT | Performed by: NURSE ANESTHETIST, CERTIFIED REGISTERED

## 2024-10-15 PROCEDURE — 43239 EGD BIOPSY SINGLE/MULTIPLE: CPT | Performed by: INTERNAL MEDICINE

## 2024-10-15 PROCEDURE — 88305 TISSUE EXAM BY PATHOLOGIST: CPT | Performed by: PATHOLOGY

## 2024-10-15 PROCEDURE — 88312 SPECIAL STAINS GROUP 1: CPT | Performed by: PATHOLOGY

## 2024-10-15 RX ORDER — B-COMPLEX WITH VITAMIN C
B COMPLEX-C(  ORAL 5000MG DAILY ) ACTIVE -HX ENTRY TABLET ORAL DAILY
Status: ACTIVE | COMMUNITY
Start: 2020-09-22

## 2024-10-15 RX ORDER — OMEPRAZOLE 20 MG/1
TAKE 1 CAPSULE BY MOUTH DAILY CAPSULE, DELAYED RELEASE ORAL
Qty: 90 CAPSULE | Refills: 0 | Status: ACTIVE | COMMUNITY

## 2024-10-15 RX ORDER — UBIDECARENONE 30 MG
MAGNESIUM( 250MG ORAL 2 DAILY ) ACTIVE -HX ENTRY CAPSULE ORAL DAILY
Status: ACTIVE | COMMUNITY
Start: 2020-09-22

## 2024-10-15 RX ORDER — OMEPRAZOLE 20 MG/1
1 CAPSULE 1/2 TO 1 HOUR BEFORE MORNING MEAL CAPSULE, DELAYED RELEASE ORAL ONCE A DAY
Qty: 90 CAPSULE | Refills: 4 | OUTPATIENT
Start: 2024-10-15

## 2024-10-31 ENCOUNTER — WEB ENCOUNTER (OUTPATIENT)
Dept: URBAN - METROPOLITAN AREA CLINIC 68 | Facility: CLINIC | Age: 64
End: 2024-10-31

## 2024-11-18 ENCOUNTER — COMPREHENSIVE EXAM (OUTPATIENT)
Dept: URBAN - METROPOLITAN AREA CLINIC 32 | Facility: CLINIC | Age: 64
End: 2024-11-18

## 2024-11-18 DIAGNOSIS — H18.513: ICD-10-CM

## 2024-11-18 DIAGNOSIS — H43.813: ICD-10-CM

## 2024-11-18 DIAGNOSIS — H26.492: ICD-10-CM

## 2024-11-18 DIAGNOSIS — H35.033: ICD-10-CM

## 2024-11-18 DIAGNOSIS — H43.391: ICD-10-CM

## 2024-11-18 DIAGNOSIS — H16.223: ICD-10-CM

## 2024-11-18 PROCEDURE — 92250 FUNDUS PHOTOGRAPHY W/I&R: CPT

## 2024-11-18 PROCEDURE — 92014 COMPRE OPH EXAM EST PT 1/>: CPT

## 2024-12-02 ENCOUNTER — OFFICE VISIT (OUTPATIENT)
Dept: URBAN - METROPOLITAN AREA CLINIC 68 | Facility: CLINIC | Age: 64
End: 2024-12-02
Payer: COMMERCIAL

## 2024-12-02 VITALS
DIASTOLIC BLOOD PRESSURE: 80 MMHG | HEART RATE: 76 BPM | WEIGHT: 143 LBS | BODY MASS INDEX: 22.98 KG/M2 | HEIGHT: 66 IN | SYSTOLIC BLOOD PRESSURE: 118 MMHG | OXYGEN SATURATION: 98 %

## 2024-12-02 DIAGNOSIS — K31.7 BENIGN GASTRIC POLYP: ICD-10-CM

## 2024-12-02 DIAGNOSIS — K21.9 GASTROESOPHAGEAL REFLUX DISEASE WITHOUT ESOPHAGITIS: ICD-10-CM

## 2024-12-02 DIAGNOSIS — Z87.19 HISTORY OF BARRETT'S ESOPHAGUS: ICD-10-CM

## 2024-12-02 DIAGNOSIS — B37.0 ORAL THRUSH: ICD-10-CM

## 2024-12-02 PROBLEM — 79740000: Status: ACTIVE | Noted: 2024-12-02

## 2024-12-02 PROBLEM — 16093611000119107: Status: ACTIVE | Noted: 2024-12-02

## 2024-12-02 PROBLEM — 78809005: Status: ACTIVE | Noted: 2024-12-02

## 2024-12-02 PROCEDURE — 99214 OFFICE O/P EST MOD 30 MIN: CPT | Performed by: INTERNAL MEDICINE

## 2024-12-02 RX ORDER — NYSTATIN 100000 [USP'U]/ML
4 ML SUSPENSION ORAL
Qty: 224 ML | OUTPATIENT
Start: 2024-12-02 | End: 2024-12-16

## 2024-12-02 RX ORDER — UBIDECARENONE 30 MG
MAGNESIUM( 250MG ORAL 2 DAILY ) ACTIVE -HX ENTRY CAPSULE ORAL DAILY
Status: ACTIVE | COMMUNITY
Start: 2020-09-22

## 2024-12-02 RX ORDER — OMEPRAZOLE 20 MG/1
1 CAPSULE 1/2 TO 1 HOUR BEFORE MORNING MEAL CAPSULE, DELAYED RELEASE ORAL ONCE A DAY
Qty: 90 CAPSULE | Refills: 4 | Status: ACTIVE | COMMUNITY
Start: 2024-10-15

## 2024-12-02 RX ORDER — B-COMPLEX WITH VITAMIN C
B COMPLEX-C(  ORAL 5000MG DAILY ) ACTIVE -HX ENTRY TABLET ORAL DAILY
Status: ACTIVE | COMMUNITY
Start: 2020-09-22

## 2024-12-02 RX ORDER — OMEPRAZOLE 20 MG/1
TAKE 1 CAPSULE BY MOUTH DAILY CAPSULE, DELAYED RELEASE ORAL
Qty: 90 CAPSULE | Refills: 0 | Status: ACTIVE | COMMUNITY

## 2024-12-02 NOTE — HPI-TODAY'S VISIT:
64 y.o. WF with chronic GERD and history of Waggoner's esophagus who is here for follow up of recent EGD on 10/18/2024 which showed a small hiatal hernia, mild gastritis, benign fundic gland polyp. Pathology showed mild reflux, no Waggoner's, no H. pylori, no celiac. She has been doing well on Omeprazole 20mg/d and only describes having some change to the color of her tongue and she may have mild oral Candidiasis. She is getting checked for Vitamin levels. Will prescribe Nystatin Swish and swallow to see if this helps. She was seen by her dentist and has been using a tongue brush which has been helpful. She does not feel that Famotidine worked as well as Omeprazole for her main complaint of "deep voice" and she had an upset stomach.